# Patient Record
Sex: MALE | Race: WHITE | HISPANIC OR LATINO | URBAN - METROPOLITAN AREA
[De-identification: names, ages, dates, MRNs, and addresses within clinical notes are randomized per-mention and may not be internally consistent; named-entity substitution may affect disease eponyms.]

---

## 2023-09-19 ENCOUNTER — HOSPITAL ENCOUNTER (INPATIENT)
Facility: HOSPITAL | Age: 58
LOS: 1 days | Discharge: HOME | DRG: 247 | End: 2023-09-20
Attending: INTERNAL MEDICINE | Admitting: INTERNAL MEDICINE

## 2023-09-19 ENCOUNTER — APPOINTMENT (OUTPATIENT)
Dept: GENERAL RADIOLOGY | Facility: HOSPITAL | Age: 58
DRG: 247 | End: 2023-09-19

## 2023-09-19 DIAGNOSIS — I21.11 STEMI INVOLVING RIGHT CORONARY ARTERY: Primary | ICD-10-CM

## 2023-09-19 LAB
ACT BLD: 281 SECONDS (ref 82–152)
ACT BLD: 281 SECONDS (ref 82–152)
ALBUMIN SERPL-MCNC: 4.4 G/DL (ref 3.5–5.2)
ALBUMIN/GLOB SERPL: 1.3 G/DL
ALP SERPL-CCNC: 100 U/L (ref 39–117)
ALT SERPL W P-5'-P-CCNC: 36 U/L (ref 1–41)
ANION GAP SERPL CALCULATED.3IONS-SCNC: 14 MMOL/L (ref 5–15)
AST SERPL-CCNC: 214 U/L (ref 1–40)
BASOPHILS # BLD AUTO: 0.04 10*3/MM3 (ref 0–0.2)
BASOPHILS NFR BLD AUTO: 0.3 % (ref 0–1.5)
BILIRUB SERPL-MCNC: 0.4 MG/DL (ref 0–1.2)
BUN SERPL-MCNC: 21 MG/DL (ref 6–20)
BUN/CREAT SERPL: 21 (ref 7–25)
CALCIUM SPEC-SCNC: 9 MG/DL (ref 8.6–10.5)
CHLORIDE SERPL-SCNC: 99 MMOL/L (ref 98–107)
CO2 SERPL-SCNC: 23 MMOL/L (ref 22–29)
CREAT BLDA-MCNC: 1.2 MG/DL (ref 0.6–1.3)
CREAT SERPL-MCNC: 1 MG/DL (ref 0.76–1.27)
DEPRECATED RDW RBC AUTO: 41.7 FL (ref 37–54)
EGFRCR SERPLBLD CKD-EPI 2021: 87.8 ML/MIN/1.73
EOSINOPHIL # BLD AUTO: 0.01 10*3/MM3 (ref 0–0.4)
EOSINOPHIL NFR BLD AUTO: 0.1 % (ref 0.3–6.2)
ERYTHROCYTE [DISTWIDTH] IN BLOOD BY AUTOMATED COUNT: 12.8 % (ref 12.3–15.4)
GEN 5 2HR TROPONIN T REFLEX: ABNORMAL NG/L
GLOBULIN UR ELPH-MCNC: 3.3 GM/DL
GLUCOSE SERPL-MCNC: 264 MG/DL (ref 65–99)
HCT VFR BLD AUTO: 36.7 % (ref 37.5–51)
HGB BLD-MCNC: 12.8 G/DL (ref 13–17.7)
IMM GRANULOCYTES # BLD AUTO: 0.06 10*3/MM3 (ref 0–0.05)
IMM GRANULOCYTES NFR BLD AUTO: 0.4 % (ref 0–0.5)
LYMPHOCYTES # BLD AUTO: 1.93 10*3/MM3 (ref 0.7–3.1)
LYMPHOCYTES NFR BLD AUTO: 12.3 % (ref 19.6–45.3)
MAGNESIUM SERPL-MCNC: 1.9 MG/DL (ref 1.6–2.6)
MCH RBC QN AUTO: 31.4 PG (ref 26.6–33)
MCHC RBC AUTO-ENTMCNC: 34.9 G/DL (ref 31.5–35.7)
MCV RBC AUTO: 90.2 FL (ref 79–97)
MONOCYTES # BLD AUTO: 0.74 10*3/MM3 (ref 0.1–0.9)
MONOCYTES NFR BLD AUTO: 4.7 % (ref 5–12)
NEUTROPHILS NFR BLD AUTO: 12.88 10*3/MM3 (ref 1.7–7)
NEUTROPHILS NFR BLD AUTO: 82.2 % (ref 42.7–76)
NRBC BLD AUTO-RTO: 0 /100 WBC (ref 0–0.2)
PLATELET # BLD AUTO: 269 10*3/MM3 (ref 140–450)
PMV BLD AUTO: 11.4 FL (ref 6–12)
POTASSIUM SERPL-SCNC: 3.7 MMOL/L (ref 3.5–5.2)
PROT SERPL-MCNC: 7.7 G/DL (ref 6–8.5)
RBC # BLD AUTO: 4.07 10*6/MM3 (ref 4.14–5.8)
SODIUM SERPL-SCNC: 136 MMOL/L (ref 136–145)
TROPONIN T DELTA: ABNORMAL
TROPONIN T SERPL HS-MCNC: 1224 NG/L
WBC NRBC COR # BLD: 15.66 10*3/MM3 (ref 3.4–10.8)

## 2023-09-19 PROCEDURE — 027034Z DILATION OF CORONARY ARTERY, ONE ARTERY WITH DRUG-ELUTING INTRALUMINAL DEVICE, PERCUTANEOUS APPROACH: ICD-10-PCS | Performed by: INTERNAL MEDICINE

## 2023-09-19 PROCEDURE — B2151ZZ FLUOROSCOPY OF LEFT HEART USING LOW OSMOLAR CONTRAST: ICD-10-PCS | Performed by: INTERNAL MEDICINE

## 2023-09-19 PROCEDURE — 25010000002 EPTIFIBATIDE 20 MG/10ML SOLUTION: Performed by: INTERNAL MEDICINE

## 2023-09-19 PROCEDURE — C1874 STENT, COATED/COV W/DEL SYS: HCPCS | Performed by: INTERNAL MEDICINE

## 2023-09-19 PROCEDURE — C1769 GUIDE WIRE: HCPCS | Performed by: INTERNAL MEDICINE

## 2023-09-19 PROCEDURE — C1753 CATH, INTRAVAS ULTRASOUND: HCPCS | Performed by: INTERNAL MEDICINE

## 2023-09-19 PROCEDURE — 83735 ASSAY OF MAGNESIUM: CPT | Performed by: INTERNAL MEDICINE

## 2023-09-19 PROCEDURE — 93458 L HRT ARTERY/VENTRICLE ANGIO: CPT | Performed by: INTERNAL MEDICINE

## 2023-09-19 PROCEDURE — 92941 PRQ TRLML REVSC TOT OCCL AMI: CPT | Performed by: INTERNAL MEDICINE

## 2023-09-19 PROCEDURE — B221Z2Z COMPUTERIZED TOMOGRAPHY (CT SCAN) OF MULTIPLE CORONARY ARTERIES USING INTRAVASCULAR OPTICAL COHERENCE: ICD-10-PCS | Performed by: INTERNAL MEDICINE

## 2023-09-19 PROCEDURE — 25010000002 FENTANYL CITRATE (PF) 50 MCG/ML SOLUTION: Performed by: INTERNAL MEDICINE

## 2023-09-19 PROCEDURE — C1894 INTRO/SHEATH, NON-LASER: HCPCS | Performed by: INTERNAL MEDICINE

## 2023-09-19 PROCEDURE — 25010000002 HEPARIN (PORCINE) PER 1000 UNITS: Performed by: INTERNAL MEDICINE

## 2023-09-19 PROCEDURE — 93005 ELECTROCARDIOGRAM TRACING: CPT | Performed by: INTERNAL MEDICINE

## 2023-09-19 PROCEDURE — 4A023N7 MEASUREMENT OF CARDIAC SAMPLING AND PRESSURE, LEFT HEART, PERCUTANEOUS APPROACH: ICD-10-PCS | Performed by: INTERNAL MEDICINE

## 2023-09-19 PROCEDURE — 25010000002 EPTIFIBATIDE PER 5 MG: Performed by: INTERNAL MEDICINE

## 2023-09-19 PROCEDURE — 02C03ZZ EXTIRPATION OF MATTER FROM CORONARY ARTERY, ONE ARTERY, PERCUTANEOUS APPROACH: ICD-10-PCS | Performed by: INTERNAL MEDICINE

## 2023-09-19 PROCEDURE — C9606 PERC D-E COR REVASC W AMI S: HCPCS | Performed by: INTERNAL MEDICINE

## 2023-09-19 PROCEDURE — C1887 CATHETER, GUIDING: HCPCS | Performed by: INTERNAL MEDICINE

## 2023-09-19 PROCEDURE — C1725 CATH, TRANSLUMIN NON-LASER: HCPCS | Performed by: INTERNAL MEDICINE

## 2023-09-19 PROCEDURE — 85347 COAGULATION TIME ACTIVATED: CPT

## 2023-09-19 PROCEDURE — 3E033PZ INTRODUCTION OF PLATELET INHIBITOR INTO PERIPHERAL VEIN, PERCUTANEOUS APPROACH: ICD-10-PCS | Performed by: INTERNAL MEDICINE

## 2023-09-19 PROCEDURE — C1757 CATH, THROMBECTOMY/EMBOLECT: HCPCS | Performed by: INTERNAL MEDICINE

## 2023-09-19 PROCEDURE — 84484 ASSAY OF TROPONIN QUANT: CPT | Performed by: INTERNAL MEDICINE

## 2023-09-19 PROCEDURE — 92978 ENDOLUMINL IVUS OCT C 1ST: CPT | Performed by: INTERNAL MEDICINE

## 2023-09-19 PROCEDURE — 25510000001 IOPAMIDOL PER 1 ML: Performed by: INTERNAL MEDICINE

## 2023-09-19 PROCEDURE — 85025 COMPLETE CBC W/AUTO DIFF WBC: CPT | Performed by: INTERNAL MEDICINE

## 2023-09-19 PROCEDURE — 80053 COMPREHEN METABOLIC PANEL: CPT | Performed by: INTERNAL MEDICINE

## 2023-09-19 PROCEDURE — 82565 ASSAY OF CREATININE: CPT

## 2023-09-19 PROCEDURE — 71045 X-RAY EXAM CHEST 1 VIEW: CPT

## 2023-09-19 PROCEDURE — 99223 1ST HOSP IP/OBS HIGH 75: CPT | Performed by: INTERNAL MEDICINE

## 2023-09-19 PROCEDURE — 25010000002 MIDAZOLAM PER 1 MG: Performed by: INTERNAL MEDICINE

## 2023-09-19 PROCEDURE — B2111ZZ FLUOROSCOPY OF MULTIPLE CORONARY ARTERIES USING LOW OSMOLAR CONTRAST: ICD-10-PCS | Performed by: INTERNAL MEDICINE

## 2023-09-19 DEVICE — XIENCE SKYPOINT™ EVEROLIMUS ELUTING CORONARY STENT SYSTEM 2.75 MM X 28 MM / RAPID-EXCHANGE
Type: IMPLANTABLE DEVICE | Site: HEART | Status: FUNCTIONAL
Brand: XIENCE SKYPOINT™

## 2023-09-19 RX ORDER — LIDOCAINE HYDROCHLORIDE 10 MG/ML
INJECTION, SOLUTION EPIDURAL; INFILTRATION; INTRACAUDAL; PERINEURAL
Status: DISCONTINUED | OUTPATIENT
Start: 2023-09-19 | End: 2023-09-19 | Stop reason: HOSPADM

## 2023-09-19 RX ORDER — ASPIRIN 81 MG/1
81 TABLET ORAL DAILY
Status: DISCONTINUED | OUTPATIENT
Start: 2023-09-20 | End: 2023-09-20 | Stop reason: HOSPADM

## 2023-09-19 RX ORDER — CLOPIDOGREL BISULFATE 75 MG/1
75 TABLET ORAL DAILY
Status: DISCONTINUED | OUTPATIENT
Start: 2023-09-20 | End: 2023-09-20 | Stop reason: HOSPADM

## 2023-09-19 RX ORDER — NICARDIPINE HCL-0.9% SOD CHLOR 1 MG/10 ML
SYRINGE (ML) INTRAVENOUS
Status: DISCONTINUED | OUTPATIENT
Start: 2023-09-19 | End: 2023-09-19 | Stop reason: HOSPADM

## 2023-09-19 RX ORDER — HEPARIN SODIUM 1000 [USP'U]/ML
INJECTION, SOLUTION INTRAVENOUS; SUBCUTANEOUS
Status: DISCONTINUED | OUTPATIENT
Start: 2023-09-19 | End: 2023-09-19 | Stop reason: HOSPADM

## 2023-09-19 RX ORDER — FENTANYL CITRATE 50 UG/ML
INJECTION, SOLUTION INTRAMUSCULAR; INTRAVENOUS
Status: DISCONTINUED | OUTPATIENT
Start: 2023-09-19 | End: 2023-09-19 | Stop reason: HOSPADM

## 2023-09-19 RX ORDER — ROSUVASTATIN CALCIUM 20 MG/1
20 TABLET, COATED ORAL NIGHTLY
Status: DISCONTINUED | OUTPATIENT
Start: 2023-09-19 | End: 2023-09-20 | Stop reason: HOSPADM

## 2023-09-19 RX ORDER — EPTIFIBATIDE 2 MG/ML
INJECTION, SOLUTION INTRAVENOUS
Status: DISCONTINUED | OUTPATIENT
Start: 2023-09-19 | End: 2023-09-19 | Stop reason: HOSPADM

## 2023-09-19 RX ORDER — MIDAZOLAM HYDROCHLORIDE 1 MG/ML
INJECTION INTRAMUSCULAR; INTRAVENOUS
Status: DISCONTINUED | OUTPATIENT
Start: 2023-09-19 | End: 2023-09-19 | Stop reason: HOSPADM

## 2023-09-19 RX ORDER — EPTIFIBATIDE 0.75 MG/ML
INJECTION, SOLUTION INTRAVENOUS
Status: COMPLETED | OUTPATIENT
Start: 2023-09-19 | End: 2023-09-19

## 2023-09-19 RX ORDER — METOPROLOL SUCCINATE 25 MG/1
12.5 TABLET, EXTENDED RELEASE ORAL
Status: DISCONTINUED | OUTPATIENT
Start: 2023-09-20 | End: 2023-09-20 | Stop reason: HOSPADM

## 2023-09-19 RX ORDER — ACETAMINOPHEN 325 MG/1
650 TABLET ORAL EVERY 4 HOURS PRN
Status: DISCONTINUED | OUTPATIENT
Start: 2023-09-19 | End: 2023-09-20 | Stop reason: HOSPADM

## 2023-09-19 RX ORDER — NITROGLYCERIN 0.4 MG/1
0.4 TABLET SUBLINGUAL
Status: DISCONTINUED | OUTPATIENT
Start: 2023-09-19 | End: 2023-09-20 | Stop reason: HOSPADM

## 2023-09-19 RX ORDER — LOSARTAN POTASSIUM 25 MG/1
25 TABLET ORAL
Status: DISCONTINUED | OUTPATIENT
Start: 2023-09-20 | End: 2023-09-20 | Stop reason: HOSPADM

## 2023-09-19 RX ORDER — LOSARTAN POTASSIUM 50 MG/1
50 TABLET ORAL 2 TIMES DAILY
COMMUNITY
End: 2023-09-20 | Stop reason: HOSPADM

## 2023-09-19 RX ORDER — CARVEDILOL 6.25 MG/1
6.25 TABLET ORAL 2 TIMES DAILY WITH MEALS
Status: DISCONTINUED | OUTPATIENT
Start: 2023-09-19 | End: 2023-09-19

## 2023-09-19 RX ORDER — EPTIFIBATIDE 0.75 MG/ML
2 INJECTION, SOLUTION INTRAVENOUS CONTINUOUS
Status: DISPENSED | OUTPATIENT
Start: 2023-09-19 | End: 2023-09-20

## 2023-09-19 RX ADMIN — ROSUVASTATIN 20 MG: 20 TABLET, FILM COATED ORAL at 22:07

## 2023-09-19 RX ADMIN — EPTIFIBATIDE 2 MCG/KG/MIN: 75 INJECTION INTRAVENOUS at 23:47

## 2023-09-19 NOTE — Clinical Note
First balloon inflation max pressure = 7 giancarlo. First balloon inflation duration = 8 seconds. Second inflation of balloon - Max pressure = 10 giancarlo. 2nd Inflation of balloon - Duration = 8 seconds. 2nd inflation was done at 17:03 EDT. Third inflation of balloon - Max pressure = 15 giancarlo. 3rd Inflation of balloon - Duration = 15 seconds. 3rd inflation was done at 17:09 EDT.

## 2023-09-19 NOTE — Clinical Note
First balloon inflation max pressure = 12 giancarlo. First balloon inflation duration = 15 seconds. Second inflation of balloon - Max pressure = 4 giancarlo. 2nd Inflation of balloon - Duration = 10 seconds. 2nd inflation was done at 16:36 EDT. Third inflation of balloon - Max pressure = 8 giancarlo. 3rd Inflation of balloon - Duration = 10 seconds. 3rd inflation was done at 16:38 EDT. Fourth inflation of balloon - Max pressure = 8 giancarlo. 4th Inflation of  balloon - Duration = 10 seconds. 4th inflation was done at 16:38 EDT.

## 2023-09-19 NOTE — H&P
[facility] Cardiology   [address], [address]  [place], [place], [address]    [phone]  [email or web address] Memorial HospitalRally FitCedar County Memorial Hospital           INPATIENT HISTORY AND PHYSICAL NOTE    Patient Care Team:  No care team member to display    Chief complaint: Chest pain         Subjective:     Cardiac focused problem list:  Diabetes  Hypertension    HPI:      Carlos Correa is a 58 y.o. male.  Patient presents with chest pain starting last night, severe, centrally located, pressure-like.  Has had intermittent chest pains before, but infrequently and not recently.  No prior cardiac history.  Non-smoker.  Mother had a heart attack in her 60s    Review of Systems:  As noted in the HPI    PFSH:  Patient Active Problem List   Diagnosis    STEMI involving right coronary artery       No current facility-administered medications on file prior to encounter.     Current Outpatient Medications on File Prior to Encounter   Medication Sig Dispense Refill    losartan (COZAAR) 50 MG tablet Take 1 tablet by mouth 2 (Two) Times a Day.      metFORMIN (GLUCOPHAGE) 850 MG tablet Take 1 tablet by mouth Daily With Breakfast.               Objective:     Vital Sign Min/Max for last 24 hours  No data recorded   BP  Min: 96/79  Max: 164/98   Pulse  Min: 60  Max: 100   Resp  Min: 18  Max: 18   SpO2  Min: 95 %  Max: 99 %   No data recorded    No intake or output data in the 24 hours ending 09/19/23 1822        Vitals:    09/19/23 1800   BP: 96/79   Pulse: 65   Resp:    SpO2: 98%     CONSTITUTIONAL: No acute distress  RESPIRATORY: Normal effort. Clear to auscultation bilaterally without wheezing or rales  CARDIOVASCULAR: Regular rate and rhythm with normal S1 and S2. Without murmur.  PERIPHERAL VASCULAR: No carotid bruit bilaterally.  Normal radial pulse. There is no lower extremity edema bilaterally.    Labs and radiologic results:  Today's results were reviewed by myself.    Cardiac Data:    EKG: Sinus rhythm, inferior ST  elevation      Tele: Sinus rhythm         Assessment and Plan:     Problem list:    STEMI involving right coronary artery      ASSESSMENT:  Acute chest pain, inferior STEMI  Status post thrombectomy and JORDAN x1 to RCA  Residual LAD disease and a small sized vessel  LV dysfunction  EF approximately 50%, inferior wall motion abnormality on left ventriculogram  Cannot rule out LV thrombus  Possible filling defect on left ventriculogram  Echo with Lumason pending  Diabetes  Hypertension  Mixed hyperlipidemia    PLAN:  Admission to telemetry  Chest x-ray, echo, labs, EKGs ordered  Continue Integrilin for 18 hours  DAPT, high intensity statin, beta-blocker started  Continue losartan but at a lower dose  Medical therapy for small sized LAD with diffuse disease  Aggressive lifestyle and risk factor modification for CAD  Additional medical therapy pending tomorrow's labs/echo  Restart metformin upon discharge  Patient intends to stay in the [place] for the next 3 months before returning to [facility]  Reasonable for discharge tomorrow if clinically stable  Follow-up in the cardiology clinic with Dr. Courtney or VITALIY Parsons, in 4 to 6 weeks    Julio Courtney MD, MSc, FACC, [facility] [place] [facility]  Interventional Cardiology  [facility]

## 2023-09-19 NOTE — Clinical Note
OCT Catheter removed. Maddison Perez  Ophthalmology  [address] [place] [id number]  Phone: [phone]  Fax: [phone]  Follow Up Time:    Maddison Perez  Ophthalmology  [address] [place] [id number]  Phone: [phone]  Fax: [phone]  Scheduled Appointment: 08/17/2023 11:45 AM

## 2023-09-20 ENCOUNTER — APPOINTMENT (OUTPATIENT)
Dept: CARDIOLOGY | Facility: HOSPITAL | Age: 58
DRG: 247 | End: 2023-09-20

## 2023-09-20 ENCOUNTER — READMISSION MANAGEMENT (OUTPATIENT)
Dept: CALL CENTER | Facility: HOSPITAL | Age: 58
End: 2023-09-20
Payer: MEDICAID

## 2023-09-20 ENCOUNTER — TELEPHONE (OUTPATIENT)
Dept: CARDIOLOGY | Facility: CLINIC | Age: 58
End: 2023-09-20

## 2023-09-20 VITALS
BODY MASS INDEX: 31.02 KG/M2 | RESPIRATION RATE: 17 BRPM | OXYGEN SATURATION: 98 % | HEART RATE: 73 BPM | HEIGHT: 66 IN | WEIGHT: 193 LBS | SYSTOLIC BLOOD PRESSURE: 123 MMHG | TEMPERATURE: 97.6 F | DIASTOLIC BLOOD PRESSURE: 76 MMHG

## 2023-09-20 DIAGNOSIS — Z76.89 ENCOUNTER TO ESTABLISH CARE: Primary | ICD-10-CM

## 2023-09-20 LAB
ANION GAP SERPL CALCULATED.3IONS-SCNC: 13 MMOL/L (ref 5–15)
BH CV ECHO MEAS - AO MAX PG: 15.1 MMHG
BH CV ECHO MEAS - AO MEAN PG: 8 MMHG
BH CV ECHO MEAS - AO ROOT DIAM: 3.2 CM
BH CV ECHO MEAS - AO V2 MAX: 194 CM/SEC
BH CV ECHO MEAS - AO V2 VTI: 35.7 CM
BH CV ECHO MEAS - AVA(I,D): 1.61 CM2
BH CV ECHO MEAS - EDV(CUBED): 94.2 ML
BH CV ECHO MEAS - EDV(MOD-SP2): 170 ML
BH CV ECHO MEAS - EDV(MOD-SP4): 131 ML
BH CV ECHO MEAS - EF(MOD-BP): 64.1 %
BH CV ECHO MEAS - EF(MOD-SP2): 58.4 %
BH CV ECHO MEAS - EF(MOD-SP4): 66.7 %
BH CV ECHO MEAS - ESV(CUBED): 32.8 ML
BH CV ECHO MEAS - ESV(MOD-SP2): 70.8 ML
BH CV ECHO MEAS - ESV(MOD-SP4): 43.6 ML
BH CV ECHO MEAS - FS: 29.7 %
BH CV ECHO MEAS - IVS/LVPW: 0.87 CM
BH CV ECHO MEAS - IVSD: 1.05 CM
BH CV ECHO MEAS - LA DIMENSION: 4.1 CM
BH CV ECHO MEAS - LAT PEAK E' VEL: 6.7 CM/SEC
BH CV ECHO MEAS - LV MASS(C)D: 183.8 GRAMS
BH CV ECHO MEAS - LV MAX PG: 4.1 MMHG
BH CV ECHO MEAS - LV MEAN PG: 2 MMHG
BH CV ECHO MEAS - LV V1 MAX: 101.5 CM/SEC
BH CV ECHO MEAS - LV V1 VTI: 18.3 CM
BH CV ECHO MEAS - LVIDD: 4.6 CM
BH CV ECHO MEAS - LVIDS: 3.2 CM
BH CV ECHO MEAS - LVOT AREA: 3.1 CM2
BH CV ECHO MEAS - LVOT DIAM: 2 CM
BH CV ECHO MEAS - LVPWD: 1.2 CM
BH CV ECHO MEAS - MED PEAK E' VEL: 6.9 CM/SEC
BH CV ECHO MEAS - MV A MAX VEL: 62.3 CM/SEC
BH CV ECHO MEAS - MV DEC SLOPE: 563 CM/SEC2
BH CV ECHO MEAS - MV DEC TIME: 0.17 SEC
BH CV ECHO MEAS - MV E MAX VEL: 83.6 CM/SEC
BH CV ECHO MEAS - MV E/A: 1.34
BH CV ECHO MEAS - MV P1/2T: 57 MSEC
BH CV ECHO MEAS - MVA(P1/2T): 3.9 CM2
BH CV ECHO MEAS - PA ACC TIME: 0.1 SEC
BH CV ECHO MEAS - PA V2 MAX: 117.3 CM/SEC
BH CV ECHO MEAS - PAPD(PI EDV): 4 MMHG
BH CV ECHO MEAS - PI END-D VEL: 96.4 CM/SEC
BH CV ECHO MEAS - RAP SYSTOLE: 15 MMHG
BH CV ECHO MEAS - RVSP: 38 MMHG
BH CV ECHO MEAS - SV(LVOT): 57.6 ML
BH CV ECHO MEAS - SV(MOD-SP2): 99.2 ML
BH CV ECHO MEAS - SV(MOD-SP4): 87.4 ML
BH CV ECHO MEAS - TAPSE (>1.6): 1.76 CM
BH CV ECHO MEAS - TR MAX PG: 23 MMHG
BH CV ECHO MEAS - TR MAX VEL: 240 CM/SEC
BH CV ECHO MEASUREMENTS AVERAGE E/E' RATIO: 12.29
BH CV VAS BP LEFT ARM: NORMAL MMHG
BH CV XLRA - RV BASE: 4.1 CM
BH CV XLRA - RV LENGTH: 7.3 CM
BH CV XLRA - RV MID: 3.5 CM
BH CV XLRA - TDI S': 11.7 CM/SEC
BUN SERPL-MCNC: 26 MG/DL (ref 6–20)
BUN/CREAT SERPL: 23.6 (ref 7–25)
CALCIUM SPEC-SCNC: 8.6 MG/DL (ref 8.6–10.5)
CHLORIDE SERPL-SCNC: 102 MMOL/L (ref 98–107)
CHOLEST SERPL-MCNC: 198 MG/DL (ref 0–200)
CO2 SERPL-SCNC: 22 MMOL/L (ref 22–29)
CREAT SERPL-MCNC: 1.1 MG/DL (ref 0.76–1.27)
DEPRECATED RDW RBC AUTO: 42.8 FL (ref 37–54)
EGFRCR SERPLBLD CKD-EPI 2021: 78.3 ML/MIN/1.73
ERYTHROCYTE [DISTWIDTH] IN BLOOD BY AUTOMATED COUNT: 12.9 % (ref 12.3–15.4)
GLUCOSE SERPL-MCNC: 263 MG/DL (ref 65–99)
HBA1C MFR BLD: 9 % (ref 4.8–5.6)
HCT VFR BLD AUTO: 32.6 % (ref 37.5–51)
HDLC SERPL-MCNC: 43 MG/DL (ref 40–60)
HGB BLD-MCNC: 11.3 G/DL (ref 13–17.7)
IVRT: 69 MS
LDLC SERPL CALC-MCNC: 117 MG/DL (ref 0–100)
LDLC/HDLC SERPL: 2.6 {RATIO}
LEFT ATRIUM VOLUME INDEX: 33.1 ML/M2
LV EF 2D ECHO EST: 60 %
MCH RBC QN AUTO: 31.4 PG (ref 26.6–33)
MCHC RBC AUTO-ENTMCNC: 34.7 G/DL (ref 31.5–35.7)
MCV RBC AUTO: 90.6 FL (ref 79–97)
NT-PROBNP SERPL-MCNC: 1363 PG/ML (ref 0–900)
PA ADP PRP-ACNC: NORMAL
PLATELET # BLD AUTO: 231 10*3/MM3 (ref 140–450)
PMV BLD AUTO: 10.9 FL (ref 6–12)
POTASSIUM SERPL-SCNC: 4.1 MMOL/L (ref 3.5–5.2)
RBC # BLD AUTO: 3.6 10*6/MM3 (ref 4.14–5.8)
SODIUM SERPL-SCNC: 137 MMOL/L (ref 136–145)
TRIGL SERPL-MCNC: 216 MG/DL (ref 0–150)
TSH SERPL DL<=0.05 MIU/L-ACNC: 1.2 UIU/ML (ref 0.27–4.2)
VLDLC SERPL-MCNC: 38 MG/DL (ref 5–40)
WBC NRBC COR # BLD: 12.28 10*3/MM3 (ref 3.4–10.8)

## 2023-09-20 PROCEDURE — 84443 ASSAY THYROID STIM HORMONE: CPT | Performed by: INTERNAL MEDICINE

## 2023-09-20 PROCEDURE — 80048 BASIC METABOLIC PNL TOTAL CA: CPT | Performed by: INTERNAL MEDICINE

## 2023-09-20 PROCEDURE — 25010000002 SULFUR HEXAFLUORIDE MICROSPH 60.7-25 MG RECONSTITUTED SUSPENSION: Performed by: INTERNAL MEDICINE

## 2023-09-20 PROCEDURE — 80061 LIPID PANEL: CPT | Performed by: INTERNAL MEDICINE

## 2023-09-20 PROCEDURE — 83880 ASSAY OF NATRIURETIC PEPTIDE: CPT | Performed by: INTERNAL MEDICINE

## 2023-09-20 PROCEDURE — 25010000002 EPTIFIBATIDE PER 5 MG: Performed by: INTERNAL MEDICINE

## 2023-09-20 PROCEDURE — 83036 HEMOGLOBIN GLYCOSYLATED A1C: CPT | Performed by: INTERNAL MEDICINE

## 2023-09-20 PROCEDURE — 93306 TTE W/DOPPLER COMPLETE: CPT | Performed by: INTERNAL MEDICINE

## 2023-09-20 PROCEDURE — 85027 COMPLETE CBC AUTOMATED: CPT | Performed by: INTERNAL MEDICINE

## 2023-09-20 PROCEDURE — 99238 HOSP IP/OBS DSCHRG MGMT 30/<: CPT

## 2023-09-20 PROCEDURE — 93010 ELECTROCARDIOGRAM REPORT: CPT | Performed by: INTERNAL MEDICINE

## 2023-09-20 PROCEDURE — 93306 TTE W/DOPPLER COMPLETE: CPT

## 2023-09-20 PROCEDURE — 93005 ELECTROCARDIOGRAM TRACING: CPT | Performed by: INTERNAL MEDICINE

## 2023-09-20 PROCEDURE — 85576 BLOOD PLATELET AGGREGATION: CPT | Performed by: INTERNAL MEDICINE

## 2023-09-20 RX ORDER — ROSUVASTATIN CALCIUM 20 MG/1
20 TABLET, COATED ORAL NIGHTLY
Qty: 90 TABLET | Refills: 0 | Status: SHIPPED | OUTPATIENT
Start: 2023-09-20

## 2023-09-20 RX ORDER — CLOPIDOGREL BISULFATE 75 MG/1
75 TABLET ORAL DAILY
Qty: 90 TABLET | Refills: 0 | Status: SHIPPED | OUTPATIENT
Start: 2023-09-21

## 2023-09-20 RX ORDER — METOPROLOL SUCCINATE 25 MG/1
12.5 TABLET, EXTENDED RELEASE ORAL
Qty: 45 TABLET | Refills: 0 | Status: SHIPPED | OUTPATIENT
Start: 2023-09-21

## 2023-09-20 RX ORDER — NITROGLYCERIN 0.4 MG/1
0.4 TABLET SUBLINGUAL
Qty: 100 TABLET | Refills: 1 | Status: SHIPPED | OUTPATIENT
Start: 2023-09-20

## 2023-09-20 RX ORDER — LOSARTAN POTASSIUM 25 MG/1
25 TABLET ORAL
Qty: 120 TABLET | Refills: 0 | Status: SHIPPED | OUTPATIENT
Start: 2023-09-21

## 2023-09-20 RX ORDER — ASPIRIN 81 MG/1
81 TABLET ORAL DAILY
Qty: 100 TABLET | Refills: 0 | Status: SHIPPED | OUTPATIENT
Start: 2023-09-21

## 2023-09-20 RX ADMIN — SULFUR HEXAFLUORIDE 3 ML: KIT at 10:15

## 2023-09-20 RX ADMIN — ASPIRIN 81 MG: 81 TABLET, COATED ORAL at 09:02

## 2023-09-20 RX ADMIN — METOPROLOL SUCCINATE 12.5 MG: 25 TABLET, EXTENDED RELEASE ORAL at 09:02

## 2023-09-20 RX ADMIN — EMPAGLIFLOZIN 10 MG: 10 TABLET, FILM COATED ORAL at 12:17

## 2023-09-20 RX ADMIN — CLOPIDOGREL BISULFATE 75 MG: 75 TABLET ORAL at 09:02

## 2023-09-20 RX ADMIN — LOSARTAN POTASSIUM 25 MG: 25 TABLET, FILM COATED ORAL at 09:02

## 2023-09-20 RX ADMIN — EPTIFIBATIDE 2 MCG/KG/MIN: 75 INJECTION INTRAVENOUS at 05:04

## 2023-09-20 NOTE — PROGRESS NOTES
"  [facility]  PROGRESS NOTE    Date of Admission: 9/19/2023  Date of Service: 09/20/23      Chief Complaint: follow up STEMI      Subjective      No acute events overnight. No chest pain or shortness of breath. Radial access site clean dry and intact.       Objective   Vitals: /70 (BP Location: Right arm, Patient Position: Lying)   Pulse 67   Temp 97.8 °F (36.6 °C) (Oral)   Resp 16   Ht 169 cm (66.54\")   Wt 87.8 kg (193 lb 8 oz)   SpO2 97%   BMI 30.73 kg/m²     Physical Exam:  GENERAL: in no acute distress.   HEENT: no jugular venous distention  HEART: Regular rhythm, normal rate, and no murmurs, gallops, or rubs.   LUNGS: Clear to auscultation bilaterally. No wheezing, rales or rhonchi.   EXTREMITIES: No clubbing, cyanosis, or edema noted.  Radial access site clean dry and intact.     Results:  Results from last 7 days   Lab Units 09/20/23  0520 09/19/23  1602   WBC 10*3/mm3 12.28* 15.66*   HEMOGLOBIN g/dL 11.3* 12.8*   HEMATOCRIT % 32.6* 36.7*   PLATELETS 10*3/mm3 231 269     Results from last 7 days   Lab Units 09/20/23  0520 09/19/23  1619 09/19/23  1602   SODIUM mmol/L 137  --  136   POTASSIUM mmol/L 4.1  --  3.7   CHLORIDE mmol/L 102  --  99   CO2 mmol/L 22.0  --  23.0   BUN mg/dL 26*  --  21*   CREATININE mg/dL 1.10 1.20 1.00   GLUCOSE mg/dL 263*  --  264*      Lab Results   Component Value Date    CHOL 198 09/20/2023    TRIG 216 (H) 09/20/2023    HDL 43 09/20/2023     (H) 09/20/2023     (H) 09/19/2023    ALT 36 09/19/2023     Results from last 7 days   Lab Units 09/20/23  0520   HEMOGLOBIN A1C % 9.00*     Results from last 7 days   Lab Units 09/20/23  0520   CHOLESTEROL mg/dL 198   TRIGLYCERIDES mg/dL 216*   HDL CHOL mg/dL 43   LDL CHOL mg/dL 117*     Results from last 7 days   Lab Units 09/20/23  0520   TSH uIU/mL 1.200             Results from last 7 days   Lab Units 09/19/23  2102 09/19/23  1602   HSTROP T ng/L >10,000* 1,224*     Results " from last 7 days   Lab Units 09/20/23  0520   PROBNP pg/mL 1,363.0*         Intake/Output Summary (Last 24 hours) at 9/20/2023 0852  Last data filed at 9/20/2023 0504  Gross per 24 hour   Intake 191.67 ml   Output --   Net 191.67 ml       I personally reviewed the patient's EKG/Telemetry data  Results for orders placed during the hospital encounter of 09/19/23    Adult Transthoracic Echo Complete w/ Color, Spectral and Contrast if Necessary Per Protocol    Interpretation Summary    Left ventricular systolic function is normal. Left ventricular ejection fraction appears to be 51 - 55%.    Left ventricular wall thickness is consistent with mild concentric hypertrophy.    The following left ventricular wall segments are hypokinetic: apical lateral, mid inferolateral, apical inferior, mid inferior and mid inferoseptal.    Left ventricular diastolic function was indeterminate.    The right ventricular cavity is borderline dilated.    Mild mitral valve regurgitation is present.    Mild tricuspid valve regurgitation is present.    Estimated right ventricular systolic pressure from tricuspid regurgitation is mildly elevated (35-45 mmHg).      [facility] 9/19/2023:  Conclusion    100% thrombotic mid RCA occlusion status post aspiration thrombectomy and stenting with a Xience Skypoint 2.75 x 28 mm drug-eluting stent and optimization up to 3.25 mm.    Residual 70% mid and 90% distal LAD stenosis.    Low normal left ventricular ejection fraction 50% with hypokinesis of the inferior segments.  Possible filling defect in the inferior segment, cannot rule out LV thrombus on the basis of this study.    LV pressures (S/D/E) 19 mmHg     Recommendations    Telemetry admission, DAPT, statin, beta-blocker.  Integrilin for 18 hours.  Continue losartan.  Chest x-ray, echo, labs ordered.  Lumason with echo to rule out LV thrombus.  Medical therapy for severe diffuse LAD disease and a very small sized vessel less than 2 mm in diameter.     Current  Medications:  aspirin, 81 mg, Oral, Daily  clopidogrel, 75 mg, Oral, Daily  losartan, 25 mg, Oral, Q24H  metoprolol succinate XL, 12.5 mg, Oral, Q24H  pharmacy consult - [place], , Does not apply, Daily  rosuvastatin, 20 mg, Oral, Nightly      eptifibatide, 2 mcg/kg/min, Last Rate: 2 mcg/kg/min (09/20/23 6400)        ASSESSMENT:  Acute chest pain, inferior STEMI  Status post thrombectomy and JORDAN x1 to RCA  Residual LAD disease and a small sized vessel  LV dysfunction/HFmrEF  EF approximately 50%, inferior wall motion abnormality on left ventriculogram  Cannot rule out LV thrombus  Possible filling defect on left ventriculogram  Echocardiogram with residual wall motion abnormality, no LV thrombus  Diabetes  Hypertension  Mixed hyperlipidemia     PLAN:   Add Jardiance 10mg daily for GDMT for LV dysfunction and better control of diabetes.   Patient is willing to see PCP for better management of diabetes prior to going back to [place]. Referral placed today.   Continue DAPT for at least 6 months, preferably 12 months.  Continue Toprol and Losartan for GDMT and blood pressure control.   Discussed importance of heart healthy diet and exercise for risk factor management.   If echocardiogram is stable, will discharge him with 4-6 week follow up with Dr. Courtney.       Addendum:  Echocardiogram is stable.  Will discharge patient at this time.  Received a call from pharmacy stating that Jardiance would be $1800.  Will discontinue medication and defer management of diabetes to primary care.    Tabby Cleary PA-C

## 2023-09-20 NOTE — PROGRESS NOTES
"Nutrition Services    Patient Name:  Carlos Correa  YOB: 1965  MRN: [id number]  Admit Date:  9/19/2023    Pt screened for MST report of \"unintentional weight loss.\" However, comment in MST of \"PT STATES HE HAS STARTED A JOB IN A FACTORY AND HAS LOST 4 LBS.\"  This would be classified as intentional and insignificant. EMR reviewed, pt does not otherwise appear to meet nutrition risk screen criteria. Will follow per protocol, please consult Nutrition if needed for acute nutritional needs.     Electronically signed by:  Olivia Bonilla RD  09/20/23 08:43 EDT   "

## 2023-09-20 NOTE — PROGRESS NOTES
Referral received for Phase II Cardiac Rehab.  Staff reviewed chart and patient has qualifying diagnosis for Phase II Cardiac Rehab. Patient was out of room at time of visit. Staff will contact patient in regards to scheduling or referring to another facility.

## 2023-09-20 NOTE — CASE MANAGEMENT/SOCIAL WORK
Continued Stay Note  [facility]     Patient Name: Carlos Correa  MRN: [id number]  Today's Date: 9/20/2023    Admit Date: 9/19/2023    Plan: Home at DC   Discharge Plan       Row Name 09/20/23 1150       Plan    Plan Home at DC    Plan Comments The pt has been DCed. No DC needs identified at this time.    Final Discharge Disposition Code 01 - home or self-care                   Discharge Codes    No documentation.                 Expected Discharge Date and Time       Expected Discharge Date Expected Discharge Time    Sep 20, 2023               Bisi Polk RN

## 2023-09-20 NOTE — PLAN OF CARE
Problem: Adult Inpatient Plan of Care  Goal: Plan of Care Review  Outcome: Ongoing, Progressing  Flowsheets (Taken 9/20/2023 0105)  Progress: improving  Plan of Care Reviewed With:   patient   spouse  Outcome Evaluation: Pt. arrived from [facility] post heart cath. Right TR band in place. Released per protocol. Dressing changed this AM to pressure dressing. No s/s of bleeding or hematoma. Instructions provided to patient via interpretor. No compalints of chest pain/SOA at this time. Pt. is A&Ox4. Pleasant. Spouse at bedside. Integrilin infusing @ 14.24 mL/hr per order. Plan for AM Stress test and possible d/c home following if no issues. Pt. and family aware of POC. RA. VSS. NSR on tele.  Goal: Absence of Hospital-Acquired Illness or Injury  Outcome: Ongoing, Progressing  Intervention: Identify and Manage Fall Risk  Recent Flowsheet Documentation  Taken 9/20/2023 0000 by Dorothy Manning RN  Safety Promotion/Fall Prevention:   activity supervised   assistive device/personal items within reach   clutter free environment maintained   elopement precautions   fall prevention program maintained   gait belt   nonskid shoes/slippers when out of bed   room organization consistent   safety round/check completed   toileting scheduled  Taken 9/19/2023 2050 by Dorothy Manning RN  Safety Promotion/Fall Prevention:   activity supervised   assistive device/personal items within reach   clutter free environment maintained   elopement precautions   fall prevention program maintained   nonskid shoes/slippers when out of bed   room organization consistent   toileting scheduled   safety round/check completed  Intervention: Prevent Skin Injury  Recent Flowsheet Documentation  Taken 9/20/2023 0000 by Dorothy Manning RN  Body Position: position changed independently  Skin Protection:   adhesive use limited   tubing/devices free from skin contact  Taken 9/19/2023 2050 by Dorothy Manning RN  Body Position: position changed  independently  Skin Protection:   adhesive use limited   tubing/devices free from skin contact  Intervention: Prevent Infection  Recent Flowsheet Documentation  Taken 9/20/2023 0000 by Dorothy Manning RN  Infection Prevention: rest/sleep promoted  Taken 9/19/2023 2050 by Dorothy Manning RN  Infection Prevention:   rest/sleep promoted   hand hygiene promoted     Problem: Hypertension Comorbidity  Goal: Blood Pressure in Desired Range  Outcome: Ongoing, Progressing  Intervention: Maintain Blood Pressure Management  Recent Flowsheet Documentation  Taken 9/20/2023 0000 by Dorothy Manning RN  Medication Review/Management: medications reviewed  Taken 9/19/2023 2050 by Dorothy Manning RN  Medication Review/Management: medications reviewed   Goal Outcome Evaluation:  Plan of Care Reviewed With: patient, spouse        Progress: improving  Outcome Evaluation: Pt. arrived from [facility] post heart cath. Right TR band in place. Released per protocol. No s/s of bleeding or hematoma. Pressure dressing applied. Instructions provided to patient via interpretor. No compalints of chest pain/SOA at this time. Pt. is A&Ox4. Pleasant. Spouse at bedside. Integrilin infusing @ 14.24 mL/hr per order. Plan for AM Stress test and possible d/c home following if no issues. Pt. and family aware of POC. RA. VSS. NSR on tele.

## 2023-09-21 ENCOUNTER — TRANSITIONAL CARE MANAGEMENT TELEPHONE ENCOUNTER (OUTPATIENT)
Dept: CALL CENTER | Facility: HOSPITAL | Age: 58
End: 2023-09-21
Payer: MEDICAID

## 2023-09-21 NOTE — OUTREACH NOTE
Call Center TCM Note      Flowsheet Row Responses   [facility] patient discharged from? [place]   Does the patient have one of the following disease processes/diagnoses(primary or secondary)? Acute MI (STEMI,NSTEMI)   TCM attempt successful? No  [no pcp vr on file]   Unsuccessful attempts Attempt 1   Call Status Voice mail issues  [vm not set up]            Shania Joseph RN    9/21/2023, 12:10 EDT

## 2023-09-21 NOTE — OUTREACH NOTE
Prep Survey      Flowsheet Row Responses   [facility] facility patient discharged from? [place]   Is LACE score < 7 ? Yes   Eligibility [facility]   Date of Admission 09/19/23   Date of Discharge 09/20/23   Discharge Disposition Home or Self Care   Discharge diagnosis STEMI   Does the patient have one of the following disease processes/diagnoses(primary or secondary)? Acute MI (STEMI,NSTEMI)   Does the patient have Home health ordered? No   Is there a DME ordered? No   Comments regarding appointments New PCP appt   Prep survey completed? Yes            NICOLAS CORADO - Registered Nurse           normal (ped)...

## 2023-09-21 NOTE — OUTREACH NOTE
Call Center TCM Note      Flowsheet Row Responses   [facility] patient discharged from? [place]   Does the patient have one of the following disease processes/diagnoses(primary or secondary)? Acute MI (STEMI,NSTEMI)   TCM attempt successful? No   Unsuccessful attempts Attempt 2            Shania Joseph RN    9/21/2023, 14:33 EDT

## 2023-09-22 ENCOUNTER — TRANSITIONAL CARE MANAGEMENT TELEPHONE ENCOUNTER (OUTPATIENT)
Dept: CALL CENTER | Facility: HOSPITAL | Age: 58
End: 2023-09-22
Payer: MEDICAID

## 2023-09-22 NOTE — OUTREACH NOTE
Call Center TCM Note      Flowsheet Row Responses   [facility] patient discharged from? [place]   Does the patient have one of the following disease processes/diagnoses(primary or secondary)? Acute MI (STEMI,NSTEMI)   TCM attempt successful? No   Unsuccessful attempts Attempt 3  [Call attempted using [employer] ID#[id number]]   TCM call completed? Yes            Eva Quinn LPN    9/22/2023, 12:03 EDT

## 2023-09-24 LAB
QT INTERVAL: 394 MS
QT INTERVAL: 404 MS
QTC INTERVAL: 413 MS
QTC INTERVAL: 434 MS

## 2023-09-27 ENCOUNTER — TELEPHONE (OUTPATIENT)
Dept: CARDIOLOGY | Facility: CLINIC | Age: 58
End: 2023-09-27

## 2023-09-27 NOTE — TELEPHONE ENCOUNTER
Caller: Migue Rios    Relationship to patient: Emergency Contact    Best call back number: [phone]      Type of visit: FOLLOW UP    Requested date: NEXT AVAILABLE     If rescheduling, when is the original appointment: 10-25-23     Additional notes:PLEASE CONTACT PATIENT WITH AN EARLIER DATE. PATIENT HAS TRANSPORTATION ISSUES AND WILL NEED A  FOR THE VISIT.

## 2023-09-28 ENCOUNTER — OFFICE VISIT (OUTPATIENT)
Dept: INTERNAL MEDICINE | Facility: CLINIC | Age: 58
End: 2023-09-28

## 2023-09-28 VITALS
TEMPERATURE: 97.3 F | WEIGHT: 194 LBS | DIASTOLIC BLOOD PRESSURE: 72 MMHG | HEART RATE: 84 BPM | SYSTOLIC BLOOD PRESSURE: 126 MMHG | HEIGHT: 69 IN | BODY MASS INDEX: 28.73 KG/M2

## 2023-09-28 DIAGNOSIS — E78.5 HYPERLIPIDEMIA, UNSPECIFIED HYPERLIPIDEMIA TYPE: ICD-10-CM

## 2023-09-28 DIAGNOSIS — Z71.88: ICD-10-CM

## 2023-09-28 DIAGNOSIS — E11.9 TYPE 2 DIABETES MELLITUS WITHOUT COMPLICATION, WITHOUT LONG-TERM CURRENT USE OF INSULIN: ICD-10-CM

## 2023-09-28 DIAGNOSIS — I25.10 CORONARY ARTERY DISEASE INVOLVING NATIVE CORONARY ARTERY OF NATIVE HEART WITHOUT ANGINA PECTORIS: ICD-10-CM

## 2023-09-28 DIAGNOSIS — I21.11 STEMI INVOLVING RIGHT CORONARY ARTERY: Primary | ICD-10-CM

## 2023-09-28 NOTE — PROGRESS NOTES
Chief Complaint  New Patient , Transitional Care Management ([facility] 9/19/2023-9/20/2023), and Cough    Subjective          Carlos Correa presents to [facility]  History of Present Illness       The patient presents today to establish care. His vital signs are stable. BMI is 29 and is in the overweight range. He is new to me. He is here today accompanied by a . I will review his previous history prior to seeing him in the exam room. His medical history includes diabetes, hyperlipidemia, and hypertension. He had a heart catheterization on 09/19/2023 after an emergency room visit.    Emergency room follow up  The patient had an overnight emergency room visit on 09/19/2023 when he went in with complaints of intermittent severe angina that was central. He had a STEMI involving the right coronary artery. He is status post thrombectomy and JORDAN x1 to RCA, residual LAD disease and small sized vessels. He also has LV dysfunction with an EF of 50 percent, inferior wall motion abnormality on the left on the echo, possible filling defect. He has diabetes, active, uncontrolled, hypertension, and hyperlipidemia. He intends to stay in the  for the next 3 months, then he will return to [place]. His instructions were to restart the metformin at discharge which he has been taking as prescribed from [place]. He was put on DAPT-clopidogrel and aspirin 81 mg coded as well as losartan, but at a lower dose of 25 mg. They added metoprolol XL 25 mg for the beta blocker and encouraged aggressive lifestyle changes. He will follow up with Dr. Courtney or LASHAY Antoine in 4 weeks.    Cardiac catheterization  The patient was recently at the hospital and noted to have a heart catheterization completed. He was told he had a stent placed. An appointment is scheduled for him to follow up with the cardiologist in 4 to 6 weeks. He is currently taking aspirin 81 mg enteric-coated tablet with Plavix  75 mg once a day.    Hypertension  The patient is taking losartan 25 mg daily and metoprolol XL 25 mg half a tablet in the morning. He discontinued Nitrostat.    Hyperlipidemia  Rosuvastatin 1 tablet daily at night is taken for hyperlipidemia.    Diabetes  Metformin 1 tablet daily is taken by the patient. He does not have a glucometer to monitor his blood glucose.    Family history  His mother had hypertension and a myocardial infarction in her 60s. She was a non-smoker. Maternal and paternal grandparents are .    Social history  Patient smokes less. Alcohol intake includes 2 to 3 beers per week. No illicit drug uses. He is sexually active with a female partner. The patient is going to [place] for 3 months and will return to the [place] 3 months again. He has been in the [place]. The patient is a [place] citizen and has a tourist visa.    Health maintenance  The patient denies any fevers, sweat, headaches, dizziness, angina, dyspnea, abdominal pain, or lower extremity edema. He denies any additional bruising, hematuria, or hematochezia.      Current Outpatient Medications:     aspirin 81 MG EC tablet, Take 1 tablet by mouth Daily., Disp: 100 tablet, Rfl: 0    clopidogrel (PLAVIX) 75 MG tablet, Take 1 tablet by mouth Daily., Disp: 90 tablet, Rfl: 0    losartan (COZAAR) 25 MG tablet, Take 1 tablet by mouth Daily., Disp: 120 tablet, Rfl: 0    metFORMIN (GLUCOPHAGE) 850 MG tablet, Take 1 tablet by mouth Daily With Breakfast., Disp: , Rfl:     metoprolol succinate XL (TOPROL-XL) 25 MG 24 hr tablet, Take 0.5 (one-half) tablet by mouth Daily., Disp: 45 tablet, Rfl: 0    nitroglycerin (NITROSTAT) 0.4 MG SL tablet, Place 1 tablet under the tongue Every 5 Minutes As Needed for Chest Pain (Systolic BP>100). Take no more than 3 doses in 15 minutes., Disp: 100 tablet, Rfl: 1    rosuvastatin (CRESTOR) 20 MG tablet, Take 1 tablet by mouth Every Night., Disp: 90 tablet, Rfl: 0    PHARMACY MEDS TO  "BED CONSULT, Use Daily., Disp: , Rfl:      Review of Systems     Objective   Vital Signs:   /72 (BP Location: Right arm, Patient Position: Sitting, Cuff Size: Adult)   Pulse 84   Temp 97.3 °F (36.3 °C) (Infrared)   Ht 174 cm (68.5\")   Wt 88 kg (194 lb)   BMI 29.07 kg/m²     Physical Exam  Vitals and nursing note reviewed.   Constitutional:       General: He is not in acute distress.     Appearance: He is well-developed. He is not ill-appearing, toxic-appearing or diaphoretic.   HENT:      Head: Normocephalic and atraumatic. No abrasion. Hair is normal.      Right Ear: Hearing, tympanic membrane, ear canal and external ear normal. No foreign body. Tympanic membrane is not perforated or erythematous.      Left Ear: Hearing, tympanic membrane, ear canal and external ear normal. No foreign body. Tympanic membrane is not perforated or erythematous.      Nose: Nose normal. No septal deviation, mucosal edema or rhinorrhea.      Mouth/Throat:      Mouth: No oral lesions.      Dentition: Normal dentition.   Eyes:      General: Lids are normal. No scleral icterus.        Right eye: No discharge.         Left eye: No discharge.      Conjunctiva/sclera: Conjunctivae normal.      Right eye: Right conjunctiva is not injected.      Left eye: Left conjunctiva is not injected.      Pupils: Pupils are equal, round, and reactive to light.   Neck:      Thyroid: No thyroid mass or thyromegaly.   Cardiovascular:      Rate and Rhythm: Normal rate and regular rhythm.      Heart sounds: Normal heart sounds. No murmur heard.    No friction rub. No gallop.   Pulmonary:      Effort: Pulmonary effort is normal. No accessory muscle usage.      Breath sounds: Normal breath sounds. No rhonchi or rales.   Chest:      Chest wall: No tenderness.   Abdominal:      General: Bowel sounds are normal. There is no distension.      Palpations: Abdomen is soft. There is no hepatomegaly.      Tenderness: There is no abdominal tenderness. "   Musculoskeletal:         General: No tenderness or deformity. Normal range of motion.      Cervical back: Full passive range of motion without pain, normal range of motion and neck supple. No edema. Normal range of motion.   Lymphadenopathy:      Cervical: No cervical adenopathy.   Skin:     General: Skin is warm and dry.      Findings: No abrasion, erythema or rash.      Nails: There is no clubbing.   Neurological:      Mental Status: He is alert, oriented to person, place, and time and easily aroused.      Cranial Nerves: No cranial nerve deficit.      Coordination: Coordination normal.      Deep Tendon Reflexes: Reflexes are normal and symmetric.      Comments: Muscle strength 5/5 and equal throughout.   Psychiatric:         Speech: Speech normal.         Behavior: Behavior normal. Behavior is cooperative.      Result Review :                 Assessment and Plan    Diagnoses and all orders for this visit:    1. STEMI involving right coronary artery (Primary)  -     Ambulatory Referral to Social Care Services (Amb Case Mgmt)    2. Encounter for counseling for socioeconomic factor  -     Ambulatory Referral to Social Care Services (Amb Case Mgmt)    3. Coronary artery disease involving native coronary artery of native heart without angina pectoris    4. Type 2 diabetes mellitus without complication, without long-term current use of insulin    5. Hyperlipidemia, unspecified hyperlipidemia type        Coronary artery disease involving native coronary artery of native heart without angina pectoris (Primary)  -    Continue to follow up with cardiology.  -    Some of his medications may cause bruising or blotching which is to be expected. Medication should not be discontinued if he notices these symptoms unless it is more than mild bruising.    Type 2 diabetes mellitus without complication, without long-term current use of insulin (HCC)  -    Continue to take metformin 1 tablet daily. Jardiance will be discontinued  per cardiology's discharge summary.  -    The patient will continue to monitor his blood glucose levels at home.    Hyperlipidemia, unspecified hyperlipidemia type  -    Rosuvastatin will continue to be taken at bedtime.    Health maintenance    Update this in patient's chart. I will call in 3 months of medication for him until he returns for his next visit.   -    Encouraged to obtain dental care. Recommended to obtain dental care at the [facility].  -    Return in 3 months for follow-up before he leaves for [place].      BMI is >= 25 and <30. (Overweight) The following options were offered after discussion;: weight loss educational material (shared in after visit summary) and exercise counseling/recommendations          Depression: PHQ-2/9 Depression Screening  Little interest or pleasure in doing things?     Feeling down, depressed, or hopeless?     PHQ-2 Total Score     PHQ-9 Total Score 0        AWV: na  A1C:   Lab Results   Component Value Date    HGBA1C 9.00 (H) 09/20/2023      ACP: Advance Care Planning   ACP discussion was held with the patient during this visit. Patient does not have an advance directive, information provided.   Mammogram: N/A  Colonoscopy: Unable to schedule due to not having insurance    Follow Up   Return in about 3 months (around 12/28/2023) for fasting, Next scheduled follow up.  Patient was given instructions and counseling regarding his condition or for health maintenance advice. Please see specific information pulled into the AVS if appropriate.     RTC/call  If symptoms worsen  Meds MOA and SE's reviewed and pt v/u    VITALIY Rodriguez [facility]  [address] [place] [phone]  Fax [phone]  Phone [phone]    Transcribed from ambient dictation for VITALIY Rodriguez by Pelon Cotter.  09/28/23   13:43 EDT    Patient or patient  representative verbalized consent to the visit recording.  I have personally performed the services described in this document as transcribed by the above individual, and it is both accurate and complete.

## 2023-09-29 ENCOUNTER — DOCUMENTATION (OUTPATIENT)
Dept: CARDIAC REHAB | Facility: HOSPITAL | Age: 58
End: 2023-09-29

## 2023-09-29 NOTE — PROGRESS NOTES
Pt. Referred for Phase II Cardiac Rehab. Staff attempts to call patient three times to inquire of interest in Phase II. First attempt, staff was hung up on and no answer the next two times staff tried to contact patient.

## 2023-10-06 ENCOUNTER — REFERRAL TRIAGE (OUTPATIENT)
Dept: CASE MANAGEMENT | Facility: OTHER | Age: 58
End: 2023-10-06

## 2023-10-06 ENCOUNTER — PATIENT OUTREACH (OUTPATIENT)
Dept: CASE MANAGEMENT | Facility: OTHER | Age: 58
End: 2023-10-06

## 2023-10-06 NOTE — OUTREACH NOTE
SW attempted to contact pt, no voicemail available.    Edwina FERNANDEZ -   Ambulatory Case Management    10/6/2023, 11:43 EDT

## 2023-10-24 ENCOUNTER — PATIENT OUTREACH (OUTPATIENT)
Dept: CASE MANAGEMENT | Facility: OTHER | Age: 58
End: 2023-10-24

## 2023-10-24 NOTE — OUTREACH NOTE
SW made multiple outreach attempts, no answer and no voicemail available. SW will close referral at this time.    Edwina FERNANDEZ -   Ambulatory Case Management    10/24/2023, 12:59 EDT

## 2024-01-12 ENCOUNTER — OFFICE VISIT (OUTPATIENT)
Dept: INTERNAL MEDICINE | Facility: CLINIC | Age: 59
End: 2024-01-12
Payer: MEDICAID

## 2024-01-12 VITALS
HEIGHT: 69 IN | HEART RATE: 64 BPM | WEIGHT: 187 LBS | DIASTOLIC BLOOD PRESSURE: 80 MMHG | BODY MASS INDEX: 27.7 KG/M2 | RESPIRATION RATE: 18 BRPM | SYSTOLIC BLOOD PRESSURE: 130 MMHG | TEMPERATURE: 97.5 F

## 2024-01-12 DIAGNOSIS — E78.5 HYPERLIPIDEMIA, UNSPECIFIED HYPERLIPIDEMIA TYPE: ICD-10-CM

## 2024-01-12 DIAGNOSIS — Z78.9 NEEDS ASSISTANCE WITH COMMUNITY RESOURCES: ICD-10-CM

## 2024-01-12 DIAGNOSIS — I25.10 CORONARY ARTERY DISEASE INVOLVING NATIVE CORONARY ARTERY OF NATIVE HEART WITHOUT ANGINA PECTORIS: Primary | ICD-10-CM

## 2024-01-12 DIAGNOSIS — E11.9 TYPE 2 DIABETES MELLITUS WITHOUT COMPLICATION, WITHOUT LONG-TERM CURRENT USE OF INSULIN: ICD-10-CM

## 2024-01-12 DIAGNOSIS — I10 HYPERTENSION, UNSPECIFIED TYPE: ICD-10-CM

## 2024-01-12 RX ORDER — CLOPIDOGREL BISULFATE 75 MG/1
75 TABLET ORAL DAILY
Qty: 90 TABLET | Refills: 0 | Status: SHIPPED | OUTPATIENT
Start: 2024-01-12

## 2024-01-12 RX ORDER — NITROGLYCERIN 0.4 MG/1
0.4 TABLET SUBLINGUAL
Qty: 100 TABLET | Refills: 1 | Status: SHIPPED | OUTPATIENT
Start: 2024-01-12

## 2024-01-12 RX ORDER — LOSARTAN POTASSIUM 25 MG/1
25 TABLET ORAL
Qty: 120 TABLET | Refills: 0 | Status: SHIPPED | OUTPATIENT
Start: 2024-01-12

## 2024-01-12 RX ORDER — ROSUVASTATIN CALCIUM 20 MG/1
20 TABLET, COATED ORAL NIGHTLY
Qty: 90 TABLET | Refills: 0 | Status: SHIPPED | OUTPATIENT
Start: 2024-01-12

## 2024-01-12 RX ORDER — ASPIRIN 81 MG/1
81 TABLET ORAL DAILY
Qty: 100 TABLET | Refills: 0 | Status: SHIPPED | OUTPATIENT
Start: 2024-01-12

## 2024-01-12 RX ORDER — METOPROLOL SUCCINATE 25 MG/1
12.5 TABLET, EXTENDED RELEASE ORAL
Qty: 45 TABLET | Refills: 0 | Status: SHIPPED | OUTPATIENT
Start: 2024-01-12

## 2024-01-12 NOTE — PATIENT INSTRUCTIONS
Advance Care Planning and Advance Directives     You make decisions on a daily basis - decisions about where you want to live, your career, your home, your life. Perhaps one of the most important decisions you face is your choice for future medical care. Take time to talk with your family and your healthcare team and start planning today.  Advance Care Planning is a process that can help you:  Understand possible future healthcare decisions in light of your own experiences  Reflect on those decision in light of your goals and values  Discuss your decisions with those closest to you and the healthcare professionals that care for you  Make a plan by creating a document that reflects your wishes    Surrogate Decision Maker  In the event of a medical emergency, which has left you unable to communicate or to make your own decisions, you would need someone to make decisions for you.  It is important to discuss your preferences for medical treatment with this person while you are in good health.     Qualities of a surrogate decision maker:  Willing to take on this role and responsibility  Knows what you want for future medical care  Willing to follow your wishes even if they don't agree with them  Able to make difficult medical decisions under stressful circumstances    Advance Directives  These are legal documents you can create that will guide your healthcare team and decision maker(s) when needed. These documents can be stored in the electronic medical record.    Living Will - a legal document to guide your care if you have a terminal condition or a serious illness and are unable to communicate. States vary by statute in document names/types, but most forms may include one or more of the following:        -  Directions regarding life-prolonging treatments        -  Directions regarding artificially provided nutrition/hydration        -  Choosing a healthcare decision maker        -  Direction regarding organ/tissue  donation    Durable Power of  for Healthcare - this document names an -in-fact to make medical decisions for you, but it may also allow this person to make personal and financial decisions for you. Please seek the advice of an  if you need this type of document.    **Advance Directives are not required and no one may discriminate against you if you do not sign one.    Medical Orders  Many states allow specific forms/orders signed by your physician to record your wishes for medical treatment in your current state of health. This form, signed in personal communication with your physician, addresses resuscitation and other medical interventions that you may or may not want.      For more information or to schedule a time with a [facility] Advance Care Planning Facilitator contact: [facility] or call [phone] and someone will contact you directly.MyPlate from USDA    MyPlate is an outline of a general healthy diet based on the Dietary Guidelines for Americans, 9186-1869, from the U.S. Department of Agriculture (USDA). It sets guidelines for how much food you should eat from each food group based on your age, sex, and level of physical activity.  What are tips for following MyPlate?  To follow MyPlate recommendations:  Eat a wide variety of fruits and vegetables, grains, and protein foods.  Serve smaller portions and eat less food throughout the day.  Limit portion sizes to avoid overeating.  Enjoy your food.  Get at least 150 minutes of exercise every week. This is about 30 minutes each day, 5 or more days per week.  It can be difficult to have every meal look like MyPlate. Think about MyPlate as eating guidelines for an entire day, rather than each individual meal.  Fruits and vegetables  Make one half of your plate fruits and vegetables.  Eat many different colors of fruits and vegetables each day.  For a 2,000-calorie daily food plan, eat:  2½ cups of vegetables every  day.  2 cups of fruit every day.  1 cup is equal to:  1 cup raw or cooked vegetables.  1 cup raw fruit.  1 medium-sized orange, apple, or banana.  1 cup 100% fruit or vegetable juice.  2 cups raw leafy greens, such as lettuce, spinach, or kale.  ½ cup dried fruit.  Grains  One fourth of your plate should be grains.  Make at least half of the grains you eat each day whole grains.  For a 2,000-calorie daily food plan, eat 6 oz of grains every day.  1 oz is equal to:  1 slice bread.  1 cup cereal.  ½ cup cooked rice, cereal, or pasta.  Protein  One fourth of your plate should be protein.  Eat a wide variety of protein foods, including meat, poultry, fish, eggs, beans, nuts, and tofu.  For a 2,000-calorie daily food plan, eat 5½ oz of protein every day.  1 oz is equal to:  1 oz meat, poultry, or fish.  ¼ cup cooked beans.  1 egg.  ½ oz nuts or seeds.  1 Tbsp peanut butter.  Dairy  Drink fat-free or low-fat (1%) milk.  Eat or drink dairy as a side to meals.  For a 2,000-calorie daily food plan, eat or drink 3 cups of dairy every day.  1 cup is equal to:  1 cup milk, yogurt, cottage cheese, or soy milk (soy beverage).  2 oz processed cheese.  1½ oz natural cheese.  Fats, oils, salt, and sugars  Only small amounts of oils are recommended.  Avoid foods that are high in calories and low in nutritional value (empty calories), like foods high in fat or added sugars.  Choose foods that are low in salt (sodium). Choose foods that have less than 140 milligrams (mg) of sodium per serving.  Drink water instead of sugary drinks. Drink enough fluid to keep your urine pale yellow.  Where to find support  Work with your health care provider or a dietitian to develop a customized eating plan that is right for you.  Download an imtiaz (mobile application) to help you track your daily food intake.  Where to find more information  USDA: ChooseMyPlate.gov  Summary  MyPlate is a general guideline for healthy eating from the USDA. It is based on  the Dietary Guidelines for Americans, 0388-7137.  In general, fruits and vegetables should take up one half of your plate, grains should take up one fourth of your plate, and protein should take up one fourth of your plate.  This information is not intended to replace advice given to you by your health care provider. Make sure you discuss any questions you have with your health care provider.  Document Revised: 11/08/2021 Document Reviewed: 11/08/2021  Elsevier Patient Education © 2023 [employer]

## 2024-01-12 NOTE — PROGRESS NOTES
Chief Complaint  STEMI involving right coronary artery (3 month follow up. )    Subjective          Carlos Correa presents to [facility]  History of Present Illness  The patient presents today for a follow-up of chronic problems. A  was utilized for today's visit, ID [id number].    Hypertension  The patient's blood pressure is within normal limits today. He is currently taking losartan 25 mg and metoprolol XL 25 mg once daily.    Weight loss  The patient has lost 7 pounds since his last visit. He is following a healthy diet and exercising a little bit.    Diabetes mellitus  The patient is currently taking metformin 850 mg once daily.  Denies polys.    Hyperlipidemia  The patient is currently taking rosuvastatin 20 mg at night.  Denies abdominal pain muscle aches or darkened urine.  Lost 7 pounds following diet and exercise.    Coronary artery disease  The patient is currently taking clopidogrel 75 mg once daily and aspirin 75 mg once daily. He denies any abdominal discomfort with the medication.    Sleep apnea  The patient still has a CPAP machine.    Health maintenance  The patient is due for immunizations.  He denies any new chest pain, shortness of breath, headache, or dizziness. He denies any new abdominal pain, changes of his habits of bowel or bladder. He denies any new lumps, bumps, or rashes.      Current Outpatient Medications:     PHARMACY MEDS TO BED CONSULT, Use Daily., Disp: , Rfl:     aspirin 81 MG EC tablet, Take 1 tablet by mouth Daily., Disp: 100 tablet, Rfl: 0    clopidogrel (PLAVIX) 75 MG tablet, Take 1 tablet by mouth Daily., Disp: 90 tablet, Rfl: 0    losartan (COZAAR) 25 MG tablet, Take 1 tablet by mouth Daily., Disp: 120 tablet, Rfl: 0    metFORMIN (GLUCOPHAGE) 850 MG tablet, Take 1 tablet by mouth 2 (Two) Times a Day With Meals., Disp: 180 tablet, Rfl: 0    metoprolol succinate XL (TOPROL-XL) 25 MG 24 hr tablet, Take 0.5  "(one-half) tablet by mouth Daily., Disp: 45 tablet, Rfl: 0    nitroglycerin (NITROSTAT) 0.4 MG SL tablet, Place 1 tablet under the tongue Every 5 Minutes As Needed for Chest Pain (Systolic BP>100). Take no more than 3 doses in 15 minutes., Disp: 100 tablet, Rfl: 1    rosuvastatin (CRESTOR) 20 MG tablet, Take 1 tablet by mouth Every Night., Disp: 90 tablet, Rfl: 0         Objective   Vital Signs:   /80 (BP Location: Right arm, Patient Position: Sitting, Cuff Size: Adult)   Pulse 64   Temp 97.5 °F (36.4 °C) (Infrared)   Resp 18   Ht 174 cm (68.5\")   Wt 84.8 kg (187 lb)   BMI 28.02 kg/m²     Physical Exam  Vitals and nursing note reviewed.   Constitutional:       General: He is not in acute distress.     Appearance: Normal appearance. He is well-developed. He is not ill-appearing.   HENT:      Head: Normocephalic and atraumatic.   Eyes:      General: No scleral icterus.  Neck:      Thyroid: No thyromegaly.   Cardiovascular:      Rate and Rhythm: Normal rate and regular rhythm.   Pulmonary:      Effort: Pulmonary effort is normal.      Breath sounds: Normal breath sounds.   Abdominal:      General: Bowel sounds are normal. There is no distension.      Palpations: Abdomen is soft.      Tenderness: There is no abdominal tenderness.   Lymphadenopathy:      Cervical: No cervical adenopathy.   Skin:     Capillary Refill: Capillary refill takes 2 to 3 seconds.      Coloration: Skin is not pale.   Neurological:      Mental Status: He is alert and oriented to person, place, and time.   Psychiatric:         Mood and Affect: Mood normal.         Behavior: Behavior normal.        Result Review :                 Assessment and Plan    Diagnoses and all orders for this visit:    1. Coronary artery disease involving native coronary artery of native heart without angina pectoris (Primary)  -     Lipid Panel; Future  -     Ambulatory Referral to Social Care Services (Amb Case Mgmt)  -     Ambulatory Referral to " Cardiology    2. Type 2 diabetes mellitus without complication, without long-term current use of insulin  -     Comprehensive Metabolic Panel; Future  -     CBC & Differential; Future  -     POC Urinalysis Dipstick, Automated  -     POC Microalbumin; Future  -     Hemoglobin A1c; Future  -     Ambulatory Referral to Social Care Services (Amb Case Mgmt)    3. Hyperlipidemia, unspecified hyperlipidemia type  -     Lipid Panel; Future  -     Ambulatory Referral to Social Care Services (Amb Case Mgmt)    4. Hypertension, unspecified type  -     Comprehensive Metabolic Panel; Future  -     POC Urinalysis Dipstick, Automated  -     Ambulatory Referral to Social Care Services (Amb Case Mgmt)    Other orders  -     aspirin 81 MG EC tablet; Take 1 tablet by mouth Daily.  Dispense: 100 tablet; Refill: 0  -     clopidogrel (PLAVIX) 75 MG tablet; Take 1 tablet by mouth Daily.  Dispense: 90 tablet; Refill: 0  -     losartan (COZAAR) 25 MG tablet; Take 1 tablet by mouth Daily.  Dispense: 120 tablet; Refill: 0  -     metFORMIN (GLUCOPHAGE) 850 MG tablet; Take 1 tablet by mouth 2 (Two) Times a Day With Meals.  Dispense: 180 tablet; Refill: 0  -     metoprolol succinate XL (TOPROL-XL) 25 MG 24 hr tablet; Take 0.5 (one-half) tablet by mouth Daily.  Dispense: 45 tablet; Refill: 0  -     nitroglycerin (NITROSTAT) 0.4 MG SL tablet; Place 1 tablet under the tongue Every 5 Minutes As Needed for Chest Pain (Systolic BP>100). Take no more than 3 doses in 15 minutes.  Dispense: 100 tablet; Refill: 1  -     rosuvastatin (CRESTOR) 20 MG tablet; Take 1 tablet by mouth Every Night.  Dispense: 90 tablet; Refill: 0    Hypertension  - The patient's blood pressure is well controlled at this time.  - He will continue his current medication regimen.    Diabetes mellitus  - The patient will continue his current medication regimen.    Hyperlipidemia  - The patient will continue his current medication regimen.    Sleep apnea  - The patient will continue  to use his CPAP machine.    Health maintenance  - The patient will check at the health department to get his immunizations.  Consult with social care services as patient does not have a temporary visa and is unable to obtain insurance and is due for health screening immunizations and physical that he cannot afford.      BMI is >= 25 and <30. (Overweight) The following options were offered after discussion;: weight loss educational material (shared in after visit summary) and exercise counseling/recommendations      A1C:   Lab Results   Component Value Date    HGBA1C 9.00 (H) 09/20/2023          Follow Up   Return in about 3 months (around 4/12/2024) for Annual, fasting.  Patient was given instructions and counseling regarding his condition or for health maintenance advice. Please see specific information pulled into the AVS if appropriate.     RTC/call  If symptoms worsen  Meds MOA and SE's reviewed and pt v/u    VITALIY Rodriguez [facility]  [address] [phone]  Fax [phone]  Phone [phone]    Transcribed from ambient dictation for VITALIY Rodriguez by Bruce Arias.  01/12/24   09:45 EST    Patient or patient representative verbalized consent to the visit recording.  I have personally performed the services described in this document as transcribed by the above individual, and it is both accurate and complete.

## 2024-01-15 ENCOUNTER — REFERRAL TRIAGE (OUTPATIENT)
Dept: CASE MANAGEMENT | Facility: OTHER | Age: 59
End: 2024-01-15
Payer: MEDICAID

## 2024-01-17 ENCOUNTER — PATIENT OUTREACH (OUTPATIENT)
Dept: CASE MANAGEMENT | Facility: OTHER | Age: 59
End: 2024-01-17
Payer: MEDICAID

## 2024-01-17 NOTE — OUTREACH NOTE
SW attempted an outreach, no voicemail available.    Edwina FERNANDEZ -   Ambulatory Case Management    1/17/2024, 15:58 EST

## 2024-01-25 ENCOUNTER — PATIENT OUTREACH (OUTPATIENT)
Dept: CASE MANAGEMENT | Facility: OTHER | Age: 59
End: 2024-01-25
Payer: MEDICAID

## 2024-01-25 NOTE — OUTREACH NOTE
SW made multiple outreach attempts, but there was no answer and no voicemail available. SW will close referral. Updated PCP.    Edwina FERNANDEZ -   Ambulatory Case Management    1/25/2024, 10:27 EST

## 2024-04-25 ENCOUNTER — TELEPHONE (OUTPATIENT)
Dept: INTERNAL MEDICINE | Facility: CLINIC | Age: 59
End: 2024-04-25

## 2024-04-25 NOTE — TELEPHONE ENCOUNTER
Patient has active lab orders that were due in January. Lab reminder has been mailed and unable to send [employer] message. Attempted to call patient with no answer, and unable to leave VM due to it not being set up. Ok to cancel orders ?

## (undated) DEVICE — GW INQWIRE FC PTFE STD J/1.5 .035 260

## (undated) DEVICE — PK CATH CARD 10

## (undated) DEVICE — KT CATH INDIGO RX ASP 140CM

## (undated) DEVICE — MODEL AT P65, P/N 701554-001KIT CONTENTS: HAND CONTROLLER, 3-WAY HIGH-PRESSURE STOPCOCK WITH ROTATING END AND PREMIUM HIGH-PRESSURE TUBING: Brand: ANGIOTOUCH® KIT

## (undated) DEVICE — GUIDE CATHETER: Brand: MACH1™

## (undated) DEVICE — DEV COMPR RADL PRELUDESYNCEZ 30ML 32CM

## (undated) DEVICE — MODEL BT2000 P/N 700287-012KIT CONTENTS: MANIFOLD WITH SALINE AND CONTRAST PORTS, SALINE TUBING WITH SPIKE AND HAND SYRINGE, TRANSDUCER: Brand: BT2000 AUTOMATED MANIFOLD KIT

## (undated) DEVICE — NC TREK NEO™ CORONARY DILATATION CATHETER 3.25 MM X 15 MM / RAPID-EXCHANGE: Brand: NC TREK NEO™

## (undated) DEVICE — Device: Brand: ASAHI SION BLUE

## (undated) DEVICE — CATH DIAG EXPO .045 PIG 5F 110CM

## (undated) DEVICE — CANSTR COL ENGINE FOR INDIGO SYS

## (undated) DEVICE — TREK CORONARY DILATATION CATHETER 2.50 MM X 15 MM / RAPID-EXCHANGE: Brand: TREK

## (undated) DEVICE — KT CATH IMG DRAGONFLY/OPSTAR 2.7F 135CM

## (undated) DEVICE — DEV INFL MONARCH 25W

## (undated) DEVICE — GLIDESHEATH SLENDER STAINLESS STEEL KIT: Brand: GLIDESHEATH SLENDER

## (undated) DEVICE — GW PT 2 MS .014 185CM STR TP

## (undated) DEVICE — CATH DIAG EXPO .045 FL3.5 5F 100CM